# Patient Record
Sex: FEMALE | Race: WHITE
[De-identification: names, ages, dates, MRNs, and addresses within clinical notes are randomized per-mention and may not be internally consistent; named-entity substitution may affect disease eponyms.]

---
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
## 2021-06-16 NOTE — NUR
CALLED TO BE TRIAGE NO RESPONSE

PATIENT LEFT WITHOUT BEING SEEN BY DR. GRACIA. NO FURTHER CARE PROVIDED FOR 
PATIENT.

## 2021-07-31 NOTE — NUR
ERMD AT BEDSIDE PERFORMING SUTURES ON PATIENT. EMT AT BEDSIDE. WOUND HAVE BEEN 
CLEANED AND IRRIAGTED PRIOR TO SUTURES. LIDOCAINE ADMINISTERED BY ERMD.
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
## 2021-08-20 NOTE — NUR
-------------------------------------------------------------------------------

            *** Note undone in [facility] - 08/20/21 at 0834 by [email or web address] ***            

-------------------------------------------------------------------------------

PT EASILY AROUSABLE TO VOICE AND STIMULATION, PT STATES SHE IS FEELING FINE AT 
THIS TIME
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
## 2021-08-20 NOTE — NUR
-------------------------------------------------------------------------------

            *** Note stu in EDM - 08/20/21 at 0900 by HAYDE ***             

-------------------------------------------------------------------------------

Patient assessed, treated, and discharged with v/s stable. Written and verbal 
after care instructions about suture removal given and explained. 

Patient verbalized understanding. Ambulatory with steady gait. All questions 
addressed prior to discharge. Advised to follow up with PMD.
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
## 2022-09-13 ENCOUNTER — HOSPITAL ENCOUNTER (EMERGENCY)
Dept: HOSPITAL 26 - MED | Age: 22
Discharge: HOME | End: 2022-09-13
Payer: MEDICAID

## 2022-09-13 VITALS — SYSTOLIC BLOOD PRESSURE: 174 MMHG | DIASTOLIC BLOOD PRESSURE: 114 MMHG

## 2022-09-13 VITALS — SYSTOLIC BLOOD PRESSURE: 107 MMHG | DIASTOLIC BLOOD PRESSURE: 62 MMHG

## 2022-09-13 VITALS — BODY MASS INDEX: 39.56 KG/M2 | WEIGHT: 215 LBS | HEIGHT: 62 IN

## 2022-09-13 DIAGNOSIS — Z79.4: ICD-10-CM

## 2022-09-13 DIAGNOSIS — E11.9: ICD-10-CM
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
## 2022-09-13 DIAGNOSIS — Z79.899: ICD-10-CM

## 2022-09-13 DIAGNOSIS — T40.601A: Primary | ICD-10-CM

## 2022-09-13 NOTE — NUR
Patient resting comfortably in bed, no c/o pain or s/s of discomfort, chest 
rise and fall symmetrical.

## 2022-09-13 NOTE — NUR
Patient discharged with v/s stable. Written and verbal after care instructions 
given and explained. 

Patient alert, oriented and verbalized understanding of instructions. 
Ambulatory with steady gait. All questions addressed prior to discharge. ID 
band removed. Patient advised to follow up with PMD. Rx of naloxone given. 
Patient educated on indication of medication including possible reaction and 
side effects. Opportunity to ask questions provided and answered.

## 2022-09-13 NOTE — NUR
10/18/21  1:10pm    Attempt to reach patient:  Social Service Navigator outreach call to follow up from last week to go over resources.    Outcome:  Unsuccessful in reaching patient.  Social Service Navigator left [facility] with contact information and requested return phone call.    Next follow up:   Few days, unless patient reaches out to writer.    Ruth Roman  Social Service Navigator  [facility]   [phone]   ER physician assessing patient.